# Patient Record
Sex: FEMALE | Race: WHITE
[De-identification: names, ages, dates, MRNs, and addresses within clinical notes are randomized per-mention and may not be internally consistent; named-entity substitution may affect disease eponyms.]

---

## 2019-03-08 ENCOUNTER — HOSPITAL ENCOUNTER (EMERGENCY)
Dept: HOSPITAL 54 - ER | Age: 29
Discharge: HOME | End: 2019-03-08
Payer: COMMERCIAL

## 2019-03-08 VITALS — WEIGHT: 270 LBS | HEIGHT: 68 IN | BODY MASS INDEX: 40.92 KG/M2

## 2019-03-08 VITALS — SYSTOLIC BLOOD PRESSURE: 136 MMHG | DIASTOLIC BLOOD PRESSURE: 88 MMHG

## 2019-03-08 DIAGNOSIS — J45.909: ICD-10-CM

## 2019-03-08 DIAGNOSIS — J18.9: Primary | ICD-10-CM

## 2020-09-18 NOTE — NUR
PT BIBS. C/O "HAVING FEVER AND A COUGH SINCE FRIDAY, SORE THROAT AND HEADACHE". 
-SOB -N/V +FEVER -ACUTE DISTRESS. stated